# Patient Record
Sex: MALE | Race: WHITE | NOT HISPANIC OR LATINO | Employment: UNEMPLOYED | URBAN - METROPOLITAN AREA
[De-identification: names, ages, dates, MRNs, and addresses within clinical notes are randomized per-mention and may not be internally consistent; named-entity substitution may affect disease eponyms.]

---

## 2024-11-23 ENCOUNTER — OFFICE VISIT (OUTPATIENT)
Dept: URGENT CARE | Facility: CLINIC | Age: 39
End: 2024-11-23

## 2024-11-23 VITALS
HEART RATE: 102 BPM | DIASTOLIC BLOOD PRESSURE: 85 MMHG | TEMPERATURE: 98.4 F | OXYGEN SATURATION: 94 % | SYSTOLIC BLOOD PRESSURE: 124 MMHG | RESPIRATION RATE: 16 BRPM

## 2024-11-23 DIAGNOSIS — J20.9 ACUTE BRONCHITIS, UNSPECIFIED ORGANISM: Primary | ICD-10-CM

## 2024-11-23 LAB
POC CORONAVIRUS 2019 BY PCR (COV19): NOT DETECTED
POC FLU A RESULT: NOT DETECTED
POC FLU B RESULT: NOT DETECTED
POC RSV PCR: NOT DETECTED

## 2024-11-23 PROCEDURE — 2500000002 HC RX 250 W HCPCS SELF ADMINISTERED DRUGS (ALT 637 FOR MEDICARE OP, ALT 636 FOR OP/ED): Performed by: NURSE PRACTITIONER

## 2024-11-23 PROCEDURE — 99213 OFFICE O/P EST LOW 20 MIN: CPT | Performed by: NURSE PRACTITIONER

## 2024-11-23 RX ORDER — ALBUTEROL SULFATE 90 UG/1
2 INHALANT RESPIRATORY (INHALATION) EVERY 6 HOURS PRN
COMMUNITY
Start: 2023-06-02

## 2024-11-23 RX ORDER — PREDNISONE 10 MG/1
TABLET ORAL
Qty: 21 TABLET | Refills: 0 | Status: SHIPPED | OUTPATIENT
Start: 2024-11-23 | End: 2024-11-28

## 2024-11-23 RX ORDER — IPRATROPIUM BROMIDE AND ALBUTEROL SULFATE 2.5; .5 MG/3ML; MG/3ML
3 SOLUTION RESPIRATORY (INHALATION) ONCE
Status: COMPLETED | OUTPATIENT
Start: 2024-11-23 | End: 2024-11-23

## 2024-11-23 RX ORDER — AZITHROMYCIN 250 MG/1
TABLET, FILM COATED ORAL
Qty: 6 TABLET | Refills: 0 | Status: SHIPPED | OUTPATIENT
Start: 2024-11-23 | End: 2024-11-28

## 2024-11-23 RX ORDER — ALBUTEROL SULFATE 90 UG/1
2 INHALANT RESPIRATORY (INHALATION) EVERY 6 HOURS PRN
Qty: 18 G | Refills: 0 | Status: SHIPPED | OUTPATIENT
Start: 2024-11-23 | End: 2025-11-23

## 2024-11-23 NOTE — LETTER
November 23, 2024     Patient: Blake Fountain   YOB: 1985   Date of Visit: 11/23/2024       To Whom It May Concern:    Blake Fountain was seen in my clinic on 11/23/2024 at 2:00 pm. Please excuse Blake for his absence from work on this day through 11/24/2024.    If you have any questions or concerns, please don't hesitate to call.         Sincerely,         Ger Arellano, LESA-CNP        CC: No Recipients

## 2024-11-23 NOTE — PROGRESS NOTES
38 y.o. male presents for evaluation of URI.  Symptoms including cough, congestion, body aches, malaise, sore throat, chills/sweats and headache have been present for 3 days and refractory to OTC meds.  No recorded fever, nausea, vomiting, abdominal pain, CP, or SOB.  No exacerbating factors. No known COVID 19/flu exposure. Is a daily smoker.        Vitals:    11/23/24 1408   BP: 124/85   Pulse: 102   Resp: 16   Temp: 36.9 °C (98.4 °F)   SpO2: 94%       No Known Allergies    Medication Documentation Review Audit       Reviewed by Kandice Casanova MA (Medical Assistant) on 11/23/24 at 1407      Medication Order Taking? Sig Documenting Provider Last Dose Status   albuterol 90 mcg/actuation inhaler [id number] Yes Inhale 2 puffs every 6 hours if needed. Historical Provider, MD  Active                    History reviewed. No pertinent past medical history.    Past Surgical History:   Procedure Laterality Date    HAND SURGERY         ROS  See HPI    Physical Exam  Vitals and nursing note reviewed.   Constitutional:       General: He is not in acute distress.     Appearance: He is ill-appearing (mildly). He is not toxic-appearing or diaphoretic.   HENT:      Head: Normocephalic and atraumatic.      Right Ear: Tympanic membrane, ear canal and external ear normal.      Left Ear: Tympanic membrane, ear canal and external ear normal.      Nose: Congestion present.      Mouth/Throat:      Mouth: Mucous membranes are moist.      Pharynx: Oropharynx is clear.   Eyes:      Extraocular Movements: Extraocular movements intact.      Conjunctiva/sclera: Conjunctivae normal.      Pupils: Pupils are equal, round, and reactive to light.   Cardiovascular:      Rate and Rhythm: Normal rate.   Pulmonary:      Effort: Pulmonary effort is normal.      Breath sounds: Wheezing and rhonchi present.      Comments: Moist cough  Lymphadenopathy:      Cervical: Cervical adenopathy present.   Skin:     General: Skin is warm.   Neurological:       General: No focal deficit present.      Mental Status: He is alert and oriented to person, place, and time.   Psychiatric:         Mood and Affect: Mood normal.         Behavior: Behavior normal.     Recent Results (from the past hour)   POCT SARS-COV-2/FLU/RSV PCR SYMPTOMATIC manually resulted    Collection Time: 11/23/24  3:00 PM   Result Value Ref Range    POC Coronavirus 2019, PCR Not Detected Not Detected    POC Flu A Result Not Detected Not Detected    POC Flu B Result Not Detected Not Detected    POC RSV PCR Not Detected Not Detected       Assessment/Plan/MDM  Blake was seen today for cough, nasal congestion, sore throat and chills, sweats.  Diagnoses and all orders for this visit:  Acute bronchitis, unspecified organism (Primary)  -     ipratropium-albuteroL (Duo-Neb) 0.5-2.5 mg/3 mL nebulizer solution 3 mL  -     POCT SARS-COV-2/FLU/RSV PCR SYMPTOMATIC manually resulted  -     azithromycin (Zithromax Z-Hemant) 250 mg tablet; Take 2 tablets (500 mg) on  Day 1,  followed by 1 tablet (250 mg) once daily on Days 2 through 5.  -     predniSONE (Deltasone) 10 mg tablet; Take 6 tablets (60 mg) by mouth once daily for 1 day, THEN 5 tablets (50 mg) once daily for 1 day, THEN 4 tablets (40 mg) once daily for 1 day, THEN 3 tablets (30 mg) once daily for 1 day, THEN 2 tablets (20 mg) once daily for 1 day, THEN 1 tablet (10 mg) once daily for 1 day.  -     albuterol 90 mcg/actuation inhaler; Inhale 2 puffs every 6 hours if needed for wheezing.    Duoneb x 1 given in office, pt tolerated well, decreased wheezing and improved air movement post treatment, Spo2 96% RA post treatment. Encouraged pt to use otc cold remedies PRN, push PO fluids and rest. Patient's clinical presentation is otherwise unremarkable at this time. Patient is discharged with instructions to follow-up with primary care or seek emergency medical attention for worsening symptoms or any new concerns.    I did personally review Blake's past medical  history, surgical history, social history, as well as family history (when relevant).  In this case, I also oversaw the his drug management by reviewing his medication list, allergy list, as well as the medications that I prescribed during the UC course and/or recommended as an out-patient (including possible OTC medications such as acetaminophen, NSAIDs , etc).    After reviewing the items above, I did look at previous medical documentation, such as recent hospitalizations, office visits, and/or recent consultations with PCP/specialist.                          SDOH:   Another factor that I considered in Blake's care was his Social Determinants of Health (SDOH). During this UC encounter, he did not have social determinants of health. Those SDOH influencing Blake's care are: none      Ger Arellano CNP  [facility]  [phone]

## 2025-02-25 ENCOUNTER — OFFICE VISIT (OUTPATIENT)
Dept: URGENT CARE | Facility: CLINIC | Age: 40
End: 2025-02-25
Payer: MEDICAID

## 2025-02-25 VITALS
RESPIRATION RATE: 15 BRPM | SYSTOLIC BLOOD PRESSURE: 115 MMHG | OXYGEN SATURATION: 97 % | HEART RATE: 72 BPM | DIASTOLIC BLOOD PRESSURE: 70 MMHG | TEMPERATURE: 97.6 F

## 2025-02-25 DIAGNOSIS — K61.0: ICD-10-CM

## 2025-02-25 DIAGNOSIS — M62.830 PARASPINAL MUSCLE SPASM: Primary | ICD-10-CM

## 2025-02-25 DIAGNOSIS — J45.30 MILD PERSISTENT REACTIVE AIRWAY DISEASE WITHOUT COMPLICATION (HHS-HCC): ICD-10-CM

## 2025-02-25 PROCEDURE — 2500000004 HC RX 250 GENERAL PHARMACY W/ HCPCS (ALT 636 FOR OP/ED): Performed by: PHYSICIAN ASSISTANT

## 2025-02-25 PROCEDURE — 96372 THER/PROPH/DIAG INJ SC/IM: CPT | Performed by: PHYSICIAN ASSISTANT

## 2025-02-25 PROCEDURE — 99212 OFFICE O/P EST SF 10 MIN: CPT | Mod: 25 | Performed by: PHYSICIAN ASSISTANT

## 2025-02-25 RX ORDER — KETOROLAC TROMETHAMINE 30 MG/ML
30 INJECTION, SOLUTION INTRAMUSCULAR; INTRAVENOUS ONCE
Status: COMPLETED | OUTPATIENT
Start: 2025-02-25 | End: 2025-02-25

## 2025-02-25 RX ORDER — DOXYCYCLINE 100 MG/1
100 CAPSULE ORAL 2 TIMES DAILY
Qty: 20 CAPSULE | Refills: 0 | Status: SHIPPED | OUTPATIENT
Start: 2025-02-25 | End: 2025-03-07

## 2025-02-25 RX ORDER — CYCLOBENZAPRINE HCL 10 MG
10 TABLET ORAL 3 TIMES DAILY
Qty: 15 TABLET | Refills: 0 | Status: SHIPPED | OUTPATIENT
Start: 2025-02-25 | End: 2025-03-02

## 2025-02-25 RX ADMIN — KETOROLAC TROMETHAMINE 30 MG: 30 INJECTION, SOLUTION INTRAMUSCULAR at 14:44

## 2025-02-25 NOTE — PROGRESS NOTES
[facility]   ABBEY NOTE:      Name: Blake Fountain, 39 y.o.    CSN:[id number]   MRN:[id number]    PCP: No Assigned PCP Generic Provider, MD    ALL:  No Known Allergies    History:    Chief Complaint: Back Pain (Side pains, abdominal pain, nausea x 2 days/Rash on bottom area x 3 days, itching and painful)    Encounter Date: 2/25/2025  14:25    HPI: The history was obtained from the patient. Blake is a 39 y.o. male, who presents with a chief complaint of Back Pain (Side pains, abdominal pain, nausea x 2 days/Rash on bottom area x 3 days, itching and painful)     Says that he has had a cold preceding this and he has some paraspinal back pain off and on worse with deep inspiration, truncal twisting, or palpation.  He is then also realized that he is got some notable inflamed lesions around his anus and this has been causing him some trouble to sit.    He has been coughing and has been coughing yellow-white sputum.    PMHx:    No past medical history on file.           Current Outpatient Medications   Medication Sig Dispense Refill    cyclobenzaprine (Flexeril) 10 mg tablet Take 1 tablet (10 mg) by mouth 3 times a day for 5 days. DO NOT DRIVE OR OPERATE HEAVY MACHINERY 15 tablet 0    doxycycline (Monodox) 100 mg capsule Take 1 capsule (100 mg) by mouth 2 times a day for 10 days. Take with at least 8 ounces (large glass) of water, do not lie down for 30 minutes after 20 capsule 0     No current facility-administered medications for this visit.         PMSx:    Past Surgical History:   Procedure Laterality Date    HAND SURGERY         Fam Hx: No family history on file.    SOC. Hx:     Social History     Socioeconomic History    Marital status: Single     Spouse name: Not on file    Number of children: Not on file    Years of education: Not on file    Highest education level: Not on file   Occupational History    Not on file   Tobacco Use    Smoking status: Every Day     Current packs/day:  0.50     Types: Cigarettes    Smokeless tobacco: Never   Substance and Sexual Activity    Alcohol use: Not on file    Drug use: Not on file    Sexual activity: Not on file   Other Topics Concern    Not on file   Social History Narrative    Not on file     Social Drivers of Health     Financial Resource Strain: Not on file   Food Insecurity: Not on file   Transportation Needs: Not on file   Physical Activity: Not on file   Stress: Not on file   Social Connections: Not on file   Intimate Partner Violence: Not on file   Housing Stability: Not on file         Vitals:    02/25/25 1412   BP: 115/70   Pulse: 72   Resp: 15   Temp: 36.4 °C (97.6 °F)   SpO2: 97%                Physical Exam  Constitutional:       Appearance: Normal appearance. He is normal weight.   HENT:      Head: Normocephalic and atraumatic.      Nose: Nose normal.      Mouth/Throat:      Mouth: Mucous membranes are moist.   Eyes:      Extraocular Movements: Extraocular movements intact.   Cardiovascular:      Rate and Rhythm: Normal rate and regular rhythm.   Pulmonary:      Effort: Pulmonary effort is normal.      Breath sounds: Normal breath sounds.   Abdominal:      General: Abdomen is flat. Bowel sounds are normal. There is no distension.      Palpations: There is no hepatomegaly, splenomegaly or mass.      Tenderness: There is no abdominal tenderness. There is no right CVA tenderness, left CVA tenderness, guarding or rebound. Negative signs include Solano's sign and Rovsing's sign.      Hernia: No hernia is present.   Genitourinary:      Musculoskeletal:         General: Normal range of motion.      Cervical back: Normal, normal range of motion and neck supple.      Thoracic back: Normal.      Lumbar back: Spasms and tenderness present. No edema, signs of trauma or bony tenderness. Normal range of motion.   Skin:     General: Skin is warm.   Neurological:      Mental Status: He is alert and oriented to person, place, and time.   Psychiatric:          Behavior: Behavior normal.       ____________________________________________________________________    I did personally review Blake's past medical history, surgical history, social history, as well as family history (when relevant).  In this case, I also oversaw the his drug management by reviewing his medication list, allergy list, as well as the medications that I prescribed during the UC course and/or recommended as an out-patient (including possible OTC medications such as acetaminophen, NSAIDs , etc).    After reviewing the items above, I did look at previous medical documentation, such as recent hospitalizations, office visits, and/or recent consultations with PCP/specialist.                          SDOH:   Another factor that I considered in Blake's care was his Social Determinants of Health (SDOH). During this UC encounter, he did have social determinants of health. Those SDOH influencing Blake's care are: financial resource strain / unemployment and access to medical care      _____________________________________________________________________      UC COURSE/MEDICAL DECISION MAKING:    Blake is a 39 y.o., who presents with a working diagnosis of   1. Paraspinal muscle spasm    2. Mild persistent reactive airway disease without complication ([facility])    3. Furuncle of anus       Current plan is to treat with a ketorolac injection here in the office, he is afebrile, did consider SCE but this was less likely has these afebrile and has no tachycardic syndrome nor audible murmur and no visible signs of tracks to extremities.  Certainly viral causes can contribute to this, did consider pneumonia as another source for the paraspinal muscle spasms, again patient is ambulatory, mobile and appears to be not experiencing any bony process.  He does vape and has a history of smoking which he is cut back on considerably, he has recently undergone a URI illness and it seems like he is experiencing a reactive  airway, given this finding and benefit from doxycycline coverage as this may also reduce the third item on the treatment list  Physical exam reveals possible for superficial furuncles which I believe are not condylomas, he has some induration to this area but no signs of any pilonidal abscess formation.    Lastly patient has no insurance coverage and is are currently self-pay having to wait until 9 PM until he is receiving some form of payment to purchase any item.  Currently employed at [employer], and provided him a note for work and recommend he return or seek emergency evaluation should symptoms persist or change.        Martin Hernandez PA-C   Advanced Practice Provider  [facility]    Please note: While the patient may or may not have received printed discharge paperwork, all relevant medical findings, test results, and treatment details are accessible through the electronic medical record system. The patient is encouraged to review their chart via the patient portal for comprehensive information and follow-up instructions.

## 2025-02-25 NOTE — LETTER
February 25, 2025     Patient: Blake Fountain   YOB: 1985   Date of Visit: 2/25/2025       To Whom It May Concern:    It is my medical opinion that Blake Fountain may return to work on 02/27/25 .    If you have any questions or concerns, please don't hesitate to call.         Sincerely,        Martin Hrenandez PA-C    CC: No Recipients